# Patient Record
Sex: FEMALE | Race: WHITE | NOT HISPANIC OR LATINO | Employment: STUDENT | URBAN - METROPOLITAN AREA
[De-identification: names, ages, dates, MRNs, and addresses within clinical notes are randomized per-mention and may not be internally consistent; named-entity substitution may affect disease eponyms.]

---
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
## 2025-01-22 ENCOUNTER — NURSE TRIAGE (OUTPATIENT)
Dept: NURSING | Facility: CLINIC | Age: 20
End: 2025-01-22
Payer: COMMERCIAL

## 2025-01-22 NOTE — TELEPHONE ENCOUNTER
.    Nurse Triage SBAR    Is this a 2nd Level Triage? NO    Situation:  sinus pain     Background: Call from patient with concerns regarding L eye pain, there is clear discharge and eye appears swollen, patient states that there is increased pain when she closes her eye. Rates pain 3/10. Patient has not rinsed out her eye as of time of call. Patient reports irritation around the eye.     Assessment: Call from patient with concerns regarding L eye pain, there is clear discharge and eye appears swollen, patient states that there is increased pain when she closes her eye. Rates pain 3/10. Patient has not rinsed out her eye as of time of call. Patient reports irritation around the eye.     Protocol Recommended Disposition:   See HCP Within 4 Hours (Or PCP Triage), See More Appropriate Guideline    Recommendation:  care advice given          Does the patient meet one of the following criteria for ADS visit consideration? No      Reason for Disposition   Has sinus pain or pressure   [1] Redness or swelling on the cheek, forehead or around the eye AND [2] no fever    Additional Information   Negative: SEVERE difficulty breathing (e.g., struggling for each breath, speaks in single words)   Negative: Sounds like a life-threatening emergency to the triager   Negative: [1] Sinus infection AND [2] taking an antibiotic AND [3] symptoms continue   Negative: [1] Difficulty breathing AND [2] not from stuffy nose (e.g., not relieved by cleaning out the nose)   Negative: [1] SEVERE headache AND [2] fever   Negative: [1] Redness or swelling on the cheek, forehead or around the eye AND [2] fever   Negative: Fever > 104 F (40 C)   Negative: Patient sounds very sick or weak to the triager   Negative: [1] SEVERE pain AND [2] not improved 2 hours after pain medicine   Negative: Followed an eye injury   Negative: Eye pain from chemical in the eye   Negative: Eye pain from foreign body in eye   Negative: [1] Tender, red lump or pimple AND  [2] located along the eyelid margin    Protocols used: Eye Pain and Other Symptoms-A-AH, Sinus Pain or Congestion-A-AH